# Patient Record
Sex: FEMALE | Race: WHITE | NOT HISPANIC OR LATINO
[De-identification: names, ages, dates, MRNs, and addresses within clinical notes are randomized per-mention and may not be internally consistent; named-entity substitution may affect disease eponyms.]

---

## 2017-11-02 ENCOUNTER — RX ONLY (OUTPATIENT)
Age: 22
Setting detail: RX ONLY
End: 2017-11-02

## 2017-11-02 RX ORDER — CLINDAMYCIN PHOSPHATE AND BENZOYL PEROXIDE 10; 25 MG/G; MG/G
GEL TOPICAL QHS
Qty: 50 | Refills: 1 | Status: ERX

## 2018-01-04 ENCOUNTER — APPOINTMENT (RX ONLY)
Dept: URBAN - METROPOLITAN AREA CLINIC 127 | Facility: CLINIC | Age: 23
Setting detail: DERMATOLOGY
End: 2018-01-04

## 2018-01-04 VITALS — WEIGHT: 120 LBS

## 2018-01-04 DIAGNOSIS — Z41.9 ENCOUNTER FOR PROCEDURE FOR PURPOSES OTHER THAN REMEDYING HEALTH STATE, UNSPECIFIED: ICD-10-CM

## 2018-01-04 DIAGNOSIS — L70.8 OTHER ACNE: ICD-10-CM | Status: WORSENING

## 2018-01-04 PROBLEM — L70.0 ACNE VULGARIS: Status: ACTIVE | Noted: 2018-01-04

## 2018-01-04 PROCEDURE — ? MICRODERMABRASION

## 2018-01-04 PROCEDURE — ? COUNSELING

## 2018-01-04 PROCEDURE — ? PRESCRIPTION

## 2018-01-04 PROCEDURE — 99213 OFFICE O/P EST LOW 20 MIN: CPT

## 2018-01-04 PROCEDURE — ? TREATMENT REGIMEN

## 2018-01-04 RX ORDER — SPIRONOLACTONE 100 MG/1
TABLET, FILM COATED ORAL QD
Qty: 90 | Refills: 0 | Status: ERX | COMMUNITY
Start: 2018-01-04

## 2018-01-04 RX ADMIN — SPIRONOLACTONE: 100 TABLET, FILM COATED ORAL at 00:00

## 2018-01-04 ASSESSMENT — LOCATION DETAILED DESCRIPTION DERM
LOCATION DETAILED: LEFT INFERIOR CENTRAL MALAR CHEEK
LOCATION DETAILED: RIGHT INFERIOR CENTRAL MALAR CHEEK
LOCATION DETAILED: RIGHT CENTRAL MALAR CHEEK

## 2018-01-04 ASSESSMENT — LOCATION ZONE DERM: LOCATION ZONE: FACE

## 2018-01-04 ASSESSMENT — LOCATION SIMPLE DESCRIPTION DERM
LOCATION SIMPLE: LEFT CHEEK
LOCATION SIMPLE: RIGHT CHEEK

## 2018-01-04 NOTE — PROCEDURE: MICRODERMABRASION
Treatment Number: 1
Indication: skin texture
Vacuum Pressure: 9
Post-Care Instructions: I reviewed with the patient in detail post-care instructions. Patient should stay away from the sun and wear sun protection until treated areas are fully healed.
Vacuum Pressure Units: mm Hg
Endpoint: mild erythema
Detail Level: Zone
Number Of Passes: 2
Consent: Verbal consent obtained, risks reviewed including but not limited to crusting, scabbing, blistering, scarring, darker or lighter pigmentary change, bruising, and/or incomplete response.
Prep Text: The patients skin was cleaned and prepped with clarisonic and epionce foaming cleanser. After PSR, I applied medical barrier and SPF 30. Epionce firming mask x 20 min right after microdermabrasion passes. Patient did ask for psr behind her ears as well, because of \"flaky skin\".
Wand: Coarse
Price (Use Numbers Only, No Special Characters Or $): 150

## 2018-01-04 NOTE — PROCEDURE: TREATMENT REGIMEN
Detail Level: Zone
Continue Regimen: Acanya
Initiate Treatment: Spironolactone qd\\nIf pt were to get any side effects from medication she can try 1/2 a tab instead of full 100mg
Plan: Discussed how PIH will improve over time once breakouts stop. Discussed how pt would benefit from microdermabrasion